# Patient Record
Sex: FEMALE | Race: WHITE
[De-identification: names, ages, dates, MRNs, and addresses within clinical notes are randomized per-mention and may not be internally consistent; named-entity substitution may affect disease eponyms.]

---

## 2018-01-03 ENCOUNTER — HOSPITAL ENCOUNTER (EMERGENCY)
Age: 38
Discharge: HOME | End: 2018-01-03

## 2018-01-03 ENCOUNTER — HOSPITAL ENCOUNTER (EMERGENCY)
Dept: HOSPITAL 91 - FTE | Age: 38
Discharge: HOME | End: 2018-01-03
Payer: MEDICAID

## 2018-01-03 DIAGNOSIS — R42: ICD-10-CM

## 2018-01-03 DIAGNOSIS — R50.9: ICD-10-CM

## 2018-01-03 DIAGNOSIS — J45.909: ICD-10-CM

## 2018-01-03 DIAGNOSIS — R05: ICD-10-CM

## 2018-01-03 DIAGNOSIS — J02.9: Primary | ICD-10-CM

## 2018-01-03 LAB
URINE PH (DIP) POC: 6 (ref 5–8.5)
URINE TOTAL PROTEIN POC: (no result)

## 2018-01-03 PROCEDURE — 81003 URINALYSIS AUTO W/O SCOPE: CPT

## 2018-01-03 PROCEDURE — 99284 EMERGENCY DEPT VISIT MOD MDM: CPT

## 2018-01-03 RX ADMIN — HYDROCODONE BITARTRATE AND ACETAMINOPHEN 1 TAB: 5; 325 TABLET ORAL at 10:03

## 2018-07-17 ENCOUNTER — HOSPITAL ENCOUNTER (EMERGENCY)
Age: 38
Discharge: HOME | End: 2018-07-17

## 2018-07-17 ENCOUNTER — HOSPITAL ENCOUNTER (EMERGENCY)
Dept: HOSPITAL 91 - FTE | Age: 38
Discharge: HOME | End: 2018-07-17
Payer: MEDICAID
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
## 2018-07-17 DIAGNOSIS — J45.909: ICD-10-CM

## 2018-07-17 DIAGNOSIS — S99.921A: Primary | ICD-10-CM
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
## 2018-07-17 PROCEDURE — 99283 EMERGENCY DEPT VISIT LOW MDM: CPT

## 2018-07-17 PROCEDURE — 73660 X-RAY EXAM OF TOE(S): CPT

## 2018-07-17 RX ADMIN — IBUPROFEN 1 MG: 800 TABLET, FILM COATED ORAL at 11:14

## 2019-03-24 ENCOUNTER — HOSPITAL ENCOUNTER (EMERGENCY)
Dept: HOSPITAL 91 - FTE | Age: 39
Discharge: HOME | End: 2019-03-24
Payer: SELF-PAY

## 2019-03-24 ENCOUNTER — HOSPITAL ENCOUNTER (EMERGENCY)
Dept: HOSPITAL 10 - FTE | Age: 39
Discharge: HOME | End: 2019-03-24
Payer: SELF-PAY

## 2019-03-24 VITALS — HEART RATE: 91 BPM | RESPIRATION RATE: 20 BRPM

## 2019-03-24 VITALS
BODY MASS INDEX: 46.09 KG/M2 | HEIGHT: 63 IN | HEIGHT: 63 IN | WEIGHT: 260.15 LBS | BODY MASS INDEX: 46.09 KG/M2 | WEIGHT: 260.15 LBS

## 2019-03-24 VITALS — SYSTOLIC BLOOD PRESSURE: 154 MMHG | DIASTOLIC BLOOD PRESSURE: 83 MMHG

## 2019-03-24 DIAGNOSIS — J45.901: Primary | ICD-10-CM

## 2019-03-24 DIAGNOSIS — Z85.841: ICD-10-CM

## 2019-03-24 DIAGNOSIS — J03.90: ICD-10-CM

## 2019-03-24 PROCEDURE — 71046 X-RAY EXAM CHEST 2 VIEWS: CPT

## 2019-03-24 PROCEDURE — 99284 EMERGENCY DEPT VISIT MOD MDM: CPT

## 2019-03-24 PROCEDURE — 96372 THER/PROPH/DIAG INJ SC/IM: CPT

## 2019-03-24 PROCEDURE — 94664 DEMO&/EVAL PT USE INHALER: CPT

## 2019-03-24 PROCEDURE — 81025 URINE PREGNANCY TEST: CPT

## 2019-03-24 RX ADMIN — ALBUTEROL SULFATE 1 MG: 2.5 SOLUTION RESPIRATORY (INHALATION) at 22:05

## 2019-03-24 RX ADMIN — METHYLPREDNISOLONE SODIUM SUCCINATE 1 MG: 125 INJECTION, POWDER, FOR SOLUTION INTRAMUSCULAR; INTRAVENOUS at 21:37

## 2019-03-24 RX ADMIN — IPRATROPIUM BROMIDE 1 MG: 0.5 SOLUTION RESPIRATORY (INHALATION) at 22:04

## 2019-03-24 NOTE — ERD
ER Documentation


Chief Complaint


Chief Complaint





COUGH WITH INTERMITTENT FEVER X 2 WEEKS





HPI


This is a 38-year-old female who presents to emergency department with 


complaints of cough with intermittent fever for about 2 weeks.  Stated that she 


is asthmatic and just has been using her inhaler without relief.


LMP: 3/4/2019.  .





Denies headache, head injury, loss of consciousness, dizziness, neck pain, neck 


stiffness, throat pain, difficulty swallowing, difficulty breathing lying flat, 


shoulder pain, chest pain, back pain, abdominal pain, nausea, vomiting, consti


pation, diarrhea, urinary symptoms, pregnancy or possibility being pregnant, 


loss of bowel and bladder control, trauma, injury, falls, difficulty walking due


to pain, numbness or tingling sensation, calf pain, recent travel, recent major 


surgery in the last 3 weeks, calf pain, recent long travel, recent exposure to 


any illness, recent antibiotic use in the last 3 months, chills, seizures.





Past medical history: Asthma.  Medication: Pro-air.


Surgical history:


Social: Denies smoking, use of alcoholic beverages, use of illegal drugs.





ROS


All systems reviewed and are negative except as per history of present illness.





Medications


Home Meds


Active Scripts


Prednisone* (Prednisone*) 20 Mg Tab, 40 MG PO DAILY for 4 Days, TAB


   Prov:RUPERTO SOLOMON F         3/24/19


Albuterol Sulfate* (Albuterol Sulfate* Neb) 0.083%-3 Ml Neb, 2.5 MG NEB Q4 PRN 


for SHORTNESS OF BREATH, #30 EA


   Prov:PASILAREN MEDINAAR F         3/24/19


Albuterol Sulfate* (Proair HFA*) 8.5 Gm Hfa.aer.ad, 2 PUFF INH Q4 PRN for 


WHEEZING, #1 INHALER


   Prov:PASILARUPERTO MEDINA F         3/24/19


Benzonatate* (Tessalon Perle*) 100 Mg Capsule, 100 MG PO Q8H PRN for COUGH, #15 


CAP


   Prov:PASILARUPERTO MEDINA F         3/24/19


Ibuprofen* (Motrin*) 800 Mg Tab, 800 MG PO Q6H PRN for PAIN AND OR ELEVATED 


TEMP, #30 TAB


   Prov:PASILARUPERTO MEDINA F         3/24/19


Amoxicillin/Potassium Clav (Amox-Clav 875-125 mg Tablet) 875-125 mg Tab, 1 TAB 


PO BID for 10 Days, #20 TAB


   Prov:RUPERTO SOLOMON         3/24/19


Acetaminophen* (Tylophen*) 500 Mg Capsule, 1 CAP PO Q6H PRN for PAIN AND OR 


ELEVATED TEMP, #30 CAP


   Prov:CARIDAD COLLADO PA-C         18


Ibuprofen* (Motrin*) 600 Mg Tab, 600 MG PO Q6, #30 TAB


   Prov:CARIDAD COLLADO-C         18


Fluticasone Propionate* (Flovent* ) 12 Gm Inha, 1 PUFF INHALATION BID, #1


INHALER


   Prov:CARIDAD COLLADO-C         1/3/18


Albuterol Sulfate* (Proair HFA*) 8.5 Gm Hfa.aer.ad, 2 PUFF INH Q4, #1 INHALER


   Prov:CARIDAD COLLADO-C         1/3/18


Guaifenesin-Dextromethorphan* (Robitussin* DM) 100MG/10MG/5ML Syrup, 10 ML PO 


Q6H PRN for COUGH for 5 Days, ML


   Prov:CARIDAD COLLADO-C         1/3/18


Acetaminophen* (Tylophen*) 500 Mg Capsule, 1 CAP PO Q6H PRN for PAIN AND OR 


ELEVATED TEMP, #30 CAP


   Prov:CARIDAD COLLADO-C         1/3/18


Ibuprofen* (Motrin*) 600 Mg Tab, 600 MG PO Q6, #30 TAB


   Prov:CARIDAD COLLADO-C         1/3/18


Oseltamivir Phosphate* (Tamiflu*) 75 Mg Capsule, 75 MG PO BID for 5 Days, CAP


   Prov:CARIDAD COLLADO-C         1/3/18


Acetaminophen* (Tylophen*) 500 Mg Capsule, 1 CAP PO Q6H PRN for PAIN, #20 CAP


   Prov:MANAGUELOD,JONNIE P NP         16


Hydrocodone Bit-Acetaminophen (Hydrocodone Bit-APAP) 5-325MG Tablet, 1 TAB PO Q6


H PRN for PAIN, #7 TAB


   Prov:SADEKYLAH DEMARCO         3/28/16


Docusate Sodium* (Colace*) 100 Mg Cap, 100 MG PO BID, #20 CAP


   Prov:SADEORA,KYLAH         3/28/16


Ondansetron Hcl* (Zofran* ODT) 4 mg -ODT Tab.disper, 4 MG PO Q6 PRN for NAUSEA 


AND/OR VOMITING, #15 TAB


   Prov:AMARIS DELEON         3/19/16


Hydrocodone Bit-Acetaminophen* (Norco*) 5-325 Mg Tab, 1 TAB PO Q6 PRN for PAIN, 


#15 TAB


   Prov:AMARIS DELEON         3/19/16


Reported Medications


Albuterol Sulfate* (Proventil* Neb) 0.5 Ml Nebu


   10/24/10





Allergies


Allergies:  


Coded Allergies:  


     No Known Drug Allergy (Verified  Allergy, Unknown, 1/3/18)





PMhx/Soc


History of Surgery:  Yes (left knee Sx, Vivian)


Anesthesia Reaction:  No


Hx Neurological Disorder:  Yes (Brain tumor)


Hx Respiratory Disorders:  Yes (Asthma)


Hx Cardiac Disorders:  No


Hx Psychiatric Problems:  No


Hx Miscellaneous Medical Probl:  No


Hx Alcohol Use:  Yes (Socially )


Hx Substance Use:  No


Hx Tobacco Use:  No


Smoking Status:  Never smoker





Physical Exam


Vitals





Vital Signs


  Date      Temp  Pulse  Resp  B/P (MAP)   Pulse Ox  O2          O2 Flow    FiO2


Time                                                 Delivery    Rate


   3/24/19           91    20                    97  Room Air


     22:48


   3/24/19           75    20                    99                           21


     22:05


   3/24/19  99.3     74    20      154/83       100


     19:21                          (106)





Physical Exam


Const:   No acute distress


Head:   Atraumatic 


Eyes:    Normal Conjunctiva


ENT:    Normal External Ears, Nose and Mouth.  Throat: Uvula is midline and 


nondisplaced.  Tonsils are +1 bilaterally with redness and with exudates. 


Tolerating secretions.  Patent airway.  Speaks full and clear sentences.  No 


tripoding.  No signs of severe airway obstruction.


Neck:               Full range of motion. No meningismus.


Resp:   No accessory muscle use in breathing.  Mild wheezing bilaterally.


Cardio:   Regular rate and rhythm, no murmurs


Abd:    Soft, non tender, non distended. Normal bowel sounds.


Skin:   No petechiae or rashes


Back:   No midline or flank tenderness


Ext:    No cyanosis, or edema


Neur:   Awake and alert.  No neurological deficit.


Psych:    Normal Mood and Affect


Results 24 hrs





Laboratory Tests


                    Test
                      3/24/19
21:28


                    POC Beta HCG, Qualitative  NEGATIVE





Current Medications


 Medications
   Dose
          Sig/Brenden
       Start Time
   Status  Last


 (Trade)       Ordered        Route
 PRN     Stop Time              Admin
Dose


                              Reason                                Admin


 Albuterol
     5 mg           ONCE  STAT
    3/24/19       DC           3/24/19


(Proventil
                   HHN
           20:49
                       22:05



0.083% (Neb))                                3/24/19 20:53


 Ipratropium
   0.5 mg         ONCE  ONCE
    3/24/19       DC           3/24/19


Bromide
                      HHN
           21:00
                       22:04



(Atrovent                                    3/24/19 21:01


0.02%



(Neb))


                125 mg         ONCE  ONCE
    3/24/19       DC           3/24/19


Methylprednis                 IM
            21:00
                       21:37



olone
 Sodium                                3/24/19 21:01


Succinate



(Solu-Medrol)








Procedures/MDM


Diagnostic tests:


POC urine pregnancy: Negative.


Chest x-ray: Low lung volumes with linear bibasilar atelectasis.  No confluent p


neumonia.





Treatment: Solu-Medrol IM.  Albuterol and Atrovent breathing treatment.





Re-evaluation:Respirations even and unlabored.  No accessory muscle use in 


breathing.  Lung sounds are clear to auscultation.  Stated that she feels much 


better examined that she is ready to go home.





Differential diagnosis


I have low suspicion for meningitis, peritonsillar abscess, mastoiditis, airway 


obstruction, bronchospasm, pneumonia, status asthmaticus.





Final diagnosis: Exudative tonsillitis.  Asthmatic bronchitis.  Asthma 


exacerbation.





Prescription: Augmentin.  Motrin.  Pro-air.  Tessalon Perles.





Follow-up with PCP in the next 24-48 hours.  Come back here in the emergency 


department for any new symptoms or any worsening symptoms.  





All questions and concerns were answered.  Patient and family members verbalized


understanding and agreed with plan of care.  





Hemodynamically stable on discharge.





Departure


Diagnosis:  


   Primary Impression:  


   Asthmatic bronchitis


   Additional Impression:  


   Exudative tonsillitis


Condition:  Stable





Additional Instructions:  


Follow-up with PCP in the next 24-48 hours.  Come back here in the emergency 


department for any new symptoms or any worsening symptoms.











RUPERTO SOLOMON               Mar 24, 2019 21:12